# Patient Record
Sex: FEMALE | Race: WHITE | NOT HISPANIC OR LATINO | Employment: FULL TIME | URBAN - METROPOLITAN AREA
[De-identification: names, ages, dates, MRNs, and addresses within clinical notes are randomized per-mention and may not be internally consistent; named-entity substitution may affect disease eponyms.]

---

## 2024-11-01 ENCOUNTER — OFFICE VISIT (OUTPATIENT)
Dept: ENDOCRINOLOGY | Facility: CLINIC | Age: 54
End: 2024-11-01
Payer: COMMERCIAL

## 2024-11-01 VITALS
BODY MASS INDEX: 31.58 KG/M2 | OXYGEN SATURATION: 97 % | WEIGHT: 185 LBS | SYSTOLIC BLOOD PRESSURE: 114 MMHG | HEART RATE: 73 BPM | HEIGHT: 64 IN | DIASTOLIC BLOOD PRESSURE: 64 MMHG

## 2024-11-01 DIAGNOSIS — Z85.850 HX OF THYROID CANCER: ICD-10-CM

## 2024-11-01 DIAGNOSIS — E89.0 POSTSURGICAL HYPOTHYROIDISM: Primary | ICD-10-CM

## 2024-11-01 LAB
T4 FREE SERPL-MCNC: 1.6 NG/DL (ref 0.92–1.68)
TSH SERPL DL<=0.05 MIU/L-ACNC: 0.1 UIU/ML (ref 0.27–4.2)

## 2024-11-01 PROCEDURE — 84443 ASSAY THYROID STIM HORMONE: CPT | Performed by: INTERNAL MEDICINE

## 2024-11-01 PROCEDURE — 84439 ASSAY OF FREE THYROXINE: CPT | Performed by: INTERNAL MEDICINE

## 2024-11-01 RX ORDER — PROGESTERONE 100 MG/1
100 CAPSULE ORAL DAILY
COMMUNITY
Start: 2023-11-08

## 2024-11-01 RX ORDER — PRAZOSIN HYDROCHLORIDE 5 MG/1
5 CAPSULE ORAL NIGHTLY
COMMUNITY
Start: 2024-08-09

## 2024-11-01 RX ORDER — FLUOXETINE 40 MG/1
80 CAPSULE ORAL DAILY
COMMUNITY
Start: 2024-08-09 | End: 2025-02-05

## 2024-11-01 RX ORDER — ROSUVASTATIN CALCIUM 5 MG/1
5 TABLET, COATED ORAL DAILY
COMMUNITY
Start: 2024-10-29

## 2024-11-01 RX ORDER — HYDROXYZINE PAMOATE 25 MG/1
25 CAPSULE ORAL 3 TIMES DAILY PRN
COMMUNITY
Start: 2024-07-12

## 2024-11-01 RX ORDER — LEVOTHYROXINE SODIUM 100 UG/1
100 TABLET ORAL DAILY
Qty: 90 TABLET | Refills: 3 | Status: SHIPPED | OUTPATIENT
Start: 2024-11-01

## 2024-11-01 RX ORDER — ESTRADIOL 0.05 MG/D
1 PATCH, EXTENDED RELEASE TRANSDERMAL 2 TIMES WEEKLY
COMMUNITY
Start: 2024-01-25 | End: 2025-01-24

## 2024-11-01 NOTE — PROGRESS NOTES
"     Office Note      Date: 2024  Patient Name: Cyndi Bauman  MRN: [id number]  : 1970    Chief Complaint   Patient presents with    Hypothyroidism     Postsurgical       History of Present Illness:   Cyndi Bauman is a 54 y.o. female who presents for Hypothyroidism (Postsurgical)    She has h/o thyroid cancer - diagnosed at age 21 when nodule was found in her neck.  She had right thyroid lobectomy - in [place], [place].  She isn't sure of the type of thyroid cancer.  She had completion thyroidectomy in  or  in [place], [place].  She was given I131.  She was followed with imaging and scans afterwards.  She had last neck u/s about 18 months ago.  This may have shown enlarged lymph node.  She notes occ trouble swallowing that has been attributed to scar tissue.     She is on T4 112mcg qd.  She is taking this correctly.  She isn't taking any interfering meds concurrently.  She c/o fatigue.  She notes some nervousness.  She notes some insomnia.      Subjective      Review of Systems:   Review of Systems   Constitutional:  Positive for fatigue.   Cardiovascular: Negative.    Gastrointestinal: Negative.    Endocrine: Negative.        The following portions of the patient's history were reviewed and updated as appropriate: allergies, current medications, past family history, past medical history, past social history, past surgical history, and problem list.    Objective     Visit Vitals  /64 (BP Location: Right arm, Patient Position: Sitting)   Pulse 73   Ht 162.6 cm (64\")   Wt 83.9 kg (185 lb)   LMP 2020   SpO2 97%   BMI 31.76 kg/m²       Physical Exam:  Physical Exam  Constitutional:       Appearance: Normal appearance.   Neurological:      Mental Status: She is alert.         Labs:    TSH  No results found for: \"TSHBASE\"     Free T4  Free T4   Date Value Ref Range Status   2024 1.85 (H) 0.82 - 1.77 ng/dL Final       T3  No results found for: \"Y6JAVEL\" " "     TPO  No results found for: \"THYROIDAB\"    TG AB  Thyroglobulin Ab   Date Value Ref Range Status   04/26/2024 <1.0 0.0 - 0.9 IU/mL Final     Comment:     Thyroglobulin Antibody measured by [employer] Methodology  It should be noted that the presence of thyroglobulin antibodies  may not be pathogenic nor diagnostic, especially at very low  levels. The assay  has found that four percent of  individuals without evidence of thyroid disease or autoimmunity  will have positive TgAb levels up to 4 IU/mL.         TG  No results found for: \"THYROGLB\"    CBC w/DIFF  Lab Results   Component Value Date    WBC 6.83 03/17/2021    RBC 4.87 03/17/2021    HGB 14.1 03/17/2021    HCT 42.3 03/17/2021    MCV 86.9 03/17/2021    MCH 29.0 03/17/2021    MCHC 33.3 03/17/2021    RDW 12.2 (L) 03/17/2021    RDWSD 39.0 03/17/2021    MPV 10.1 03/17/2021     03/17/2021    NEUTRORELPCT 67.4 03/17/2021    LYMPHORELPCT 24.2 03/17/2021    MONORELPCT 5.4 03/17/2021    EOSRELPCT 2.5 03/17/2021    BASORELPCT 0.4 03/17/2021    AUTOIGPER 0.1 03/17/2021    NEUTROABS 4.60 03/17/2021    LYMPHSABS 1.65 03/17/2021    MONOSABS 0.37 03/17/2021    EOSABS 0.17 03/17/2021    BASOSABS 0.03 03/17/2021    AUTOIGNUM 0.01 03/17/2021    NRBC 0.0 03/17/2021           Assessment / Plan      Assessment & Plan:  Diagnoses and all orders for this visit:    1. Postsurgical hypothyroidism (Primary)  Assessment & Plan:  Continue T4 tx.  Check TFTs.    Orders:  -     TSH; Future  -     T4, Free; Future    2. Hx of thyroid cancer  Assessment & Plan:  Unsure of type and unable to track down old pathology reports.  Low risk for recurrence given the time frame.  TG undetectable 4/2024.    A neck u/s was performed today.  This revealed clear thyroid bed with no masses.  No abnormal lymph nodes were seen.    Orders:  -     US Thyroid      Current Outpatient Medications   Medication Instructions    amLODIPine (NORVASC) 5 mg, Daily    buPROPion (WELLBUTRIN) 75 " mg, 2 Times Daily    estradiol (MINIVELLE, VIVELLE-DOT) 0.05 MG/24HR patch 1 patch, 2 Times Weekly    FLUoxetine (PROZAC) 80 mg, Daily    fluticasone (FLONASE) 50 MCG/ACT nasal spray 2 sprays, Nasal, Nightly, Administer 2 sprays in each nostril for each dose.    hydrOXYzine pamoate (VISTARIL) 25 mg, 3 Times Daily PRN    levothyroxine (SYNTHROID, LEVOTHROID) 112 mcg, Oral, Daily    lisinopril (PRINIVIL,ZESTRIL) 40 mg, Daily    pantoprazole (PROTONIX) 40 mg, Daily    prazosin (MINIPRESS) 5 mg, Nightly    Progesterone (PROMETRIUM) 100 mg, Daily    rosuvastatin (CRESTOR) 5 mg, Daily      Return in about 6 months (around 5/1/2025) for Recheck with TSH, free T4.    Electronically signed by: Daniel Sen MD  11/01/2024

## 2024-11-01 NOTE — ASSESSMENT & PLAN NOTE
Unsure of type and unable to track down old pathology reports.  Low risk for recurrence given the time frame.  TG undetectable 4/2024.    A neck u/s was performed today.  This revealed clear thyroid bed with no masses.  No abnormal lymph nodes were seen.

## 2024-11-07 ENCOUNTER — TELEPHONE (OUTPATIENT)
Dept: ENDOCRINOLOGY | Facility: CLINIC | Age: 54
End: 2024-11-07

## 2024-11-07 NOTE — TELEPHONE ENCOUNTER
PT CALLED STATING HER BS AND A1C WERE HIGH PREVIOUSLY. SHE REQUESTED TO KNOW IF SHE NEEDS TO HAVE IT DRAWN AGAIN, IF WE COULD ADD THE ORDERS, AND SHE REQUESTED A CALL BACK TO CONSULT.

## 2025-04-28 RX ORDER — LEVOTHYROXINE SODIUM 112 UG/1
112 TABLET ORAL DAILY
Qty: 90 TABLET | Refills: 3 | OUTPATIENT
Start: 2025-04-28

## 2025-05-22 ENCOUNTER — TELEPHONE (OUTPATIENT)
Dept: ENDOCRINOLOGY | Facility: CLINIC | Age: 55
End: 2025-05-22

## 2025-05-22 DIAGNOSIS — E89.0 POSTSURGICAL HYPOTHYROIDISM: Primary | ICD-10-CM

## 2025-05-22 NOTE — TELEPHONE ENCOUNTER
Caller: Cyndi Garcia    Relationship to patient: Self    Best call back number: [phone]    Patient is needing: PT IS NEEDING A LAB ORDER PUT IN, PLEASE CALL OR MESSAGE THROUGH [employer] WHEN PUT IN.

## 2025-06-04 ENCOUNTER — TELEPHONE (OUTPATIENT)
Dept: ENDOCRINOLOGY | Facility: CLINIC | Age: 55
End: 2025-06-04

## 2025-06-04 NOTE — TELEPHONE ENCOUNTER
Provider: DR DAVIS    Caller: Cyndi Garcia    Relationship to Patient: Self    Reason for Call: PATIENT WOULD LIKE LAB ORDER TO BE FAXED TO #[phone]. PHONE NUMBER TO [facility] [phone]. PLEASE REACH OUT TO PATIENT ONCE FAXED

## 2025-06-10 LAB
T4 FREE SERPL-MCNC: 1.13 NG/DL (ref 0.82–1.77)
T4 SERPL-MCNC: 8.9 UG/DL (ref 4.5–12)
TSH SERPL DL<=0.005 MIU/L-ACNC: 0.97 UIU/ML (ref 0.45–4.5)

## 2025-06-12 ENCOUNTER — OFFICE VISIT (OUTPATIENT)
Dept: ENDOCRINOLOGY | Facility: CLINIC | Age: 55
End: 2025-06-12
Payer: COMMERCIAL

## 2025-06-12 VITALS
OXYGEN SATURATION: 95 % | HEART RATE: 83 BPM | DIASTOLIC BLOOD PRESSURE: 68 MMHG | BODY MASS INDEX: 31.07 KG/M2 | HEIGHT: 64 IN | SYSTOLIC BLOOD PRESSURE: 114 MMHG | WEIGHT: 182 LBS

## 2025-06-12 DIAGNOSIS — Z85.850 HX OF THYROID CANCER: ICD-10-CM

## 2025-06-12 DIAGNOSIS — E89.0 POSTSURGICAL HYPOTHYROIDISM: Primary | ICD-10-CM

## 2025-06-12 PROCEDURE — 99213 OFFICE O/P EST LOW 20 MIN: CPT | Performed by: INTERNAL MEDICINE

## 2025-06-12 NOTE — LETTER
2025     Rubens Pierre MD  [address]  [address]  formerly [facility] [address]    Patient: Cyndi Garcia   YOB: 1970   Date of Visit: 2025     Dear Rubens Pierre MD:       Thank you for referring Cyndi Garcia to me for evaluation. Below are the relevant portions of my assessment and plan of care.    If you have questions, please do not hesitate to call me. I look forward to following Cyndi along with you.         Sincerely,        Daniel Sen MD        CC: No Recipients    Daniel Sen MD  25 1133  Sign when Signing Visit       Office Note      Date: 2025  Patient Name: Cyndi Garcia  MRN: [id number]  : 1970    Chief Complaint   Patient presents with   • Hypothyroidism       History of Present Illness:   Cyndi Garcia is a 54 y.o. female who presents for Hypothyroidism    She remains on T4 100mcg qd.  She is taking this correctly.  She isn't taking any interfering meds concurrently.  She c/o fatigue.  She notes less nervousness.  She notes improvement in insomnia.    She has h/o thyroid cancer - diagnosed at age 21 when nodule was found in her neck.  She had right thyroid lobectomy - in [place], [place].  She isn't sure of the type of thyroid cancer.  She had completion thyroidectomy in  or  in [place], [place].  She was given I131.  She was followed with imaging and scans afterwards.  She notes occ trouble swallowing that has been attributed to scar tissue.    Undetectable unstimulated TG in 2024.  Negative neck u/s 2024.    Subjective      Review of Systems:   Review of Systems   Constitutional:  Positive for fatigue.   Cardiovascular: Negative.    Gastrointestinal: Negative.    Endocrine: Negative.        The following portions of the patient's history were reviewed and updated as appropriate: allergies, current medications, past family history, past medical history, past social history, past  "surgical history, and problem list.    Objective     Visit Vitals  /68   Pulse 83   Ht 162.6 cm (64\")   Wt 82.6 kg (182 lb)   LMP 11/11/2020   SpO2 95%   BMI 31.24 kg/m²       Physical Exam:  Physical Exam  Constitutional:       Appearance: Normal appearance.   Neck:      Comments: No palpable thyroid tissue or neck masses  Lymphadenopathy:      Cervical: No cervical adenopathy.   Neurological:      Mental Status: She is alert.         Labs:    TSH  No results found for: \"TSHBASE\"     Free T4  Free T4   Date Value Ref Range Status   06/09/2025 1.13 0.82 - 1.77 ng/dL Final   11/01/2024 1.60 0.92 - 1.68 ng/dL Final       T3  No results found for: \"U1HEZAZ\"      TPO  No results found for: \"THYROIDAB\"    TG AB  Thyroglobulin Ab   Date Value Ref Range Status   04/26/2024 <1.0 0.0 - 0.9 IU/mL Final     Comment:     Thyroglobulin Antibody measured by [employer] Methodology  It should be noted that the presence of thyroglobulin antibodies  may not be pathogenic nor diagnostic, especially at very low  levels. The assay  has found that four percent of  individuals without evidence of thyroid disease or autoimmunity  will have positive TgAb levels up to 4 IU/mL.         TG  No results found for: \"THYROGLB\"    CBC w/DIFF  Lab Results   Component Value Date    WBC 6.83 03/17/2021    RBC 4.87 03/17/2021    HGB 14.1 03/17/2021    HCT 42.3 03/17/2021    MCV 86.9 03/17/2021    MCH 29.0 03/17/2021    MCHC 33.3 03/17/2021    RDW 12.2 (L) 03/17/2021    RDWSD 39.0 03/17/2021    MPV 10.1 03/17/2021     03/17/2021    NEUTRORELPCT 67.4 03/17/2021    LYMPHORELPCT 24.2 03/17/2021    MONORELPCT 5.4 03/17/2021    EOSRELPCT 2.5 03/17/2021    BASORELPCT 0.4 03/17/2021    AUTOIGPER 0.1 03/17/2021    NEUTROABS 4.60 03/17/2021    LYMPHSABS 1.65 03/17/2021    MONOSABS 0.37 03/17/2021    EOSABS 0.17 03/17/2021    BASOSABS 0.03 03/17/2021    AUTOIGNUM 0.01 03/17/2021    NRBC 0.0 03/17/2021           Assessment / Plan  "     Assessment & Plan:  Diagnoses and all orders for this visit:    1. Postsurgical hypothyroidism (Primary)  Assessment & Plan:  Continue levothyroxine.  TSH earlier this week was okay at 0.973.      2. Hx of thyroid cancer  Assessment & Plan:  Neck exam okay today.  No evidence of recurrence.  Presumably at low risk for recurrence but we haven't been able to get pathology reports from the surgery.          Current Outpatient Medications   Medication Instructions   • buPROPion (WELLBUTRIN) 75 mg, 2 Times Daily   • estradiol (MINIVELLE, VIVELLE-DOT) 0.05 MG/24HR patch 1 patch, 2 Times Weekly   • FLUoxetine (PROZAC) 80 mg, Daily   • fluticasone (FLONASE) 50 MCG/ACT nasal spray 2 sprays, Nasal, Nightly, Administer 2 sprays in each nostril for each dose.   • hydrOXYzine pamoate (VISTARIL) 25 mg, 3 Times Daily PRN   • levothyroxine (SYNTHROID, LEVOTHROID) 100 mcg, Oral, Daily   • lisinopril (PRINIVIL,ZESTRIL) 40 mg, Daily   • pantoprazole (PROTONIX) 40 mg, Daily   • prazosin (MINIPRESS) 5 mg, Nightly   • Progesterone (PROMETRIUM) 100 mg, Daily   • rosuvastatin (CRESTOR) 5 mg, Daily      Return in about 6 months (around 12/12/2025) for Recheck with TSH.    Electronically signed by: Daniel Sen MD  06/12/2025

## 2025-06-12 NOTE — ASSESSMENT & PLAN NOTE
Neck exam okay today.  No evidence of recurrence.  Presumably at low risk for recurrence but we haven't been able to get pathology reports from the surgery.

## 2025-06-12 NOTE — PROGRESS NOTES
"     Office Note      Date: 2025  Patient Name: Cyndi Garcia  MRN: [id number]  : 1970    Chief Complaint   Patient presents with    Hypothyroidism       History of Present Illness:   Cyndi Garcia is a 54 y.o. female who presents for Hypothyroidism    She remains on T4 100mcg qd.  She is taking this correctly.  She isn't taking any interfering meds concurrently.  She c/o fatigue.  She notes less nervousness.  She notes improvement in insomnia.    She has h/o thyroid cancer - diagnosed at age 21 when nodule was found in her neck.  She had right thyroid lobectomy - in [place].  She isn't sure of the type of thyroid cancer.  She had completion thyroidectomy in  or  in [place], [place].  She was given I131.  She was followed with imaging and scans afterwards.  She notes occ trouble swallowing that has been attributed to scar tissue.    Undetectable unstimulated TG in 2024.  Negative neck u/s 2024.    Subjective      Review of Systems:   Review of Systems   Constitutional:  Positive for fatigue.   Cardiovascular: Negative.    Gastrointestinal: Negative.    Endocrine: Negative.        The following portions of the patient's history were reviewed and updated as appropriate: allergies, current medications, past family history, past medical history, past social history, past surgical history, and problem list.    Objective     Visit Vitals  /68   Pulse 83   Ht 162.6 cm (64\")   Wt 82.6 kg (182 lb)   LMP 2020   SpO2 95%   BMI 31.24 kg/m²       Physical Exam:  Physical Exam  Constitutional:       Appearance: Normal appearance.   Neck:      Comments: No palpable thyroid tissue or neck masses  Lymphadenopathy:      Cervical: No cervical adenopathy.   Neurological:      Mental Status: She is alert.         Labs:    TSH  No results found for: \"TSHBASE\"     Free T4  Free T4   Date Value Ref Range Status   2025 1.13 0.82 - 1.77 ng/dL Final   2024 1.60 0.92 - " "1.68 ng/dL Final       T3  No results found for: \"P3BACYQ\"      TPO  No results found for: \"THYROIDAB\"    TG AB  Thyroglobulin Ab   Date Value Ref Range Status   04/26/2024 <1.0 0.0 - 0.9 IU/mL Final     Comment:     Thyroglobulin Antibody measured by [employer] Methodology  It should be noted that the presence of thyroglobulin antibodies  may not be pathogenic nor diagnostic, especially at very low  levels. The assay  has found that four percent of  individuals without evidence of thyroid disease or autoimmunity  will have positive TgAb levels up to 4 IU/mL.         TG  No results found for: \"THYROGLB\"    CBC w/DIFF  Lab Results   Component Value Date    WBC 6.83 03/17/2021    RBC 4.87 03/17/2021    HGB 14.1 03/17/2021    HCT 42.3 03/17/2021    MCV 86.9 03/17/2021    MCH 29.0 03/17/2021    MCHC 33.3 03/17/2021    RDW 12.2 (L) 03/17/2021    RDWSD 39.0 03/17/2021    MPV 10.1 03/17/2021     03/17/2021    NEUTRORELPCT 67.4 03/17/2021    LYMPHORELPCT 24.2 03/17/2021    MONORELPCT 5.4 03/17/2021    EOSRELPCT 2.5 03/17/2021    BASORELPCT 0.4 03/17/2021    AUTOIGPER 0.1 03/17/2021    NEUTROABS 4.60 03/17/2021    LYMPHSABS 1.65 03/17/2021    MONOSABS 0.37 03/17/2021    EOSABS 0.17 03/17/2021    BASOSABS 0.03 03/17/2021    AUTOIGNUM 0.01 03/17/2021    NRBC 0.0 03/17/2021           Assessment / Plan      Assessment & Plan:  Diagnoses and all orders for this visit:    1. Postsurgical hypothyroidism (Primary)  Assessment & Plan:  Continue levothyroxine.  TSH earlier this week was okay at 0.973.      2. Hx of thyroid cancer  Assessment & Plan:  Neck exam okay today.  No evidence of recurrence.  Presumably at low risk for recurrence but we haven't been able to get pathology reports from the surgery.          Current Outpatient Medications   Medication Instructions    buPROPion (WELLBUTRIN) 75 mg, 2 Times Daily    estradiol (MINIVELLE, VIVELLE-DOT) 0.05 MG/24HR patch 1 patch, 2 Times Weekly    FLUoxetine " (PROZAC) 80 mg, Daily    fluticasone (FLONASE) 50 MCG/ACT nasal spray 2 sprays, Nasal, Nightly, Administer 2 sprays in each nostril for each dose.    hydrOXYzine pamoate (VISTARIL) 25 mg, 3 Times Daily PRN    levothyroxine (SYNTHROID, LEVOTHROID) 100 mcg, Oral, Daily    lisinopril (PRINIVIL,ZESTRIL) 40 mg, Daily    pantoprazole (PROTONIX) 40 mg, Daily    prazosin (MINIPRESS) 5 mg, Nightly    Progesterone (PROMETRIUM) 100 mg, Daily    rosuvastatin (CRESTOR) 5 mg, Daily      Return in about 6 months (around 12/12/2025) for Recheck with TSH.    Electronically signed by: Daniel Sen MD  06/12/2025